# Patient Record
Sex: FEMALE | Race: WHITE
[De-identification: names, ages, dates, MRNs, and addresses within clinical notes are randomized per-mention and may not be internally consistent; named-entity substitution may affect disease eponyms.]

---

## 2020-05-24 NOTE — EKG
[place] [address]
Phone:  [phone]                     ELECTROCARDIOGRAM REPORT      
_______________________________________________________________________________
 
Name:         MARNI VAZQUZE                Room:                     [facility]#:    [id number]     Account #:     [id number]  
Admission:    20    Attend Phys:                     
Discharge:    20    Date of Birth: [birth year]  
Date of Service: 20 1312  Report #:      [id number]
        [id number]
_______________________________________________________________________________
THIS REPORT FOR:  //name//                      
 
                         [facility] ED
                                       
Test Date:    2020               Test Time:    13:12:30
Pat Name:     MARNI VAZQUEZ             Department:   
Patient ID:   [id number]            Room:          
Gender:       F                        Technician:   [facility]
:          1990               Requested By: Deana Petersen
Order Number: [id number] MD:   Duc Aguilera
                                 Measurements
Intervals                              Axis          
Rate:         73                       P:            53
NM:           166                      QRS:          -6
QRSD:         100                      T:            3
QT:           371                                    
QTc:          409                                    
                           Interpretive Statements
Sinus rhythm
Low voltage, precordial leads
No previous ECG available for comparison
 
Electronically Signed On 2020 13:11:03 CDT by Duc Aguilera
[email or web address]
 
 
 
 
 
 
 
 
 
 
 
 
 
 
 
 
 
 
 
 
  <ELECTRONICALLY SIGNED>
                                           By: Duc Aguilera MD, [facility]      
  20     1311
D: 20 1312   _____________________________________
T: 20 131   Duc Aguilera MD, [facility]        /EPI

## 2020-11-25 NOTE — EXE
[place] [address]
Phone:  [phone]                     STRESS ECHOCARDIOGRAM         
_______________________________________________________________________________
 
Name:         MARNI VAZQUEZ                Room:                     [facility]
M.R.#:    [id number]     Account #:     [id number]  
Admission:    11/25/20    Attend Phys:   Branden Garcia DO   
Discharge:                Date of Birth: [birth year]  
Date of Service: 11/25/20 1409  Report #:      [id number]
        [id number]
_______________________________________________________________________________
THIS REPORT FOR:
 
cc:  Branden Garcia Adam J DO Holkins, John M. MD [facility]       
                                                                       ~
 
--------------- APPROVED REPORT --------------
 
 
Study performed:  11/25/2020 11:25:44
 
Exam:  Stress Echocardiogram
Indication: Syncope
Patient Location: Out-Patient
Stress Nurse: Henna Borrego RN
Supervising Physician: Duc Aguilera MD
 
Ht: 5 ft 4 in      
HR: 115 bpm       BP: 128/84 mmHg 
 
Medical History
Cardiac Risk Factors: FHX of CAD
 
Procedure
The patient underwent an Exercise Stress Test using the Alexander 
Protocol. Blood pressure, heart rate, and EKG were monitored.
An Echocardiogram was performed by technician in four stages in quad 
fashion.  At peak stress, four selected images were obtained and 
placed side by side with resting images for comparison.
 
Stress Test Details
Stress Test:  Exercise stress testing was performed using a Alexander 
protocol.      
HR           
Resting HR:            115 bpm   Max Heart Rate (APMHR): 188 bpm 
 
Max HR Achieved:  188 bpm   Target HR (85% APMHR): 159 bpm  
% of APMHR:         100         
Recovery HR:            119 bpm        
HR response to stress: Normal HR response to stress      
 
BP           
Resting BP:  128/84 mmHg        
Max BP:       165/82 mmHg        
Recovery BP:       127/80 mmHg        
 
 
[place] [address]
Phone:  [phone]                     STRESS ECHOCARDIOGRAM         
_______________________________________________________________________________
 
Name:         MARNI VAZQUEZ                Room:                     [facility]
M.R.#:    [id number]     Account #:     [id number]  
Admission:    11/25/20    Attend Phys:   Branden Garcia DO   
Discharge:                Date of Birth: [birth year]  
Date of Service: 11/25/20 1409  Report #:      [id number]
        [id number]
_______________________________________________________________________________
BP response to stress: Normal blood pressure response to 
stress.      
ECG           
Resting ECG:  sinus rhythm, minor IVCD right type      
Stress ECG:     no ischemic st-t changes      
Arrhythmia:    no arrhtyhmias noted       
Recovery ECG: minor nonspecific st-t changes 
noted      
 
Clinical
Reason for Termination: Completed protocol, Maximal effort
Exercise duration: 9 min 01 sec
Highest Stage Achieved: Stage 3: 3.4 mph at 14% grade. 
Exercise capacity: 10.16 METs
 
Pre-Stress Echo
The resting Echocardiogram showed normal left ventricular 
contractility with an estimated Ejection Fraction of about 60-65%. 
Normal wall motion in all segments on baseline images.
 
Post-Stress Echo
The stress Echocardiogram showed normal left ventricular 
contractility with an estimated Ejection Fraction of about >70%. 
Normal augmentation of wall motion in all segments on post stress 
images. 
 
Conclusion
Clinical Response:  Non-ischemic
Exercise Capacity:  Average
Stress ECG Response:  Non-ischemic
Stress Echo Images:  Non-ischemic
 
Other Information
Study Quality: Good
 
 
 
 
 
 
 
 
 
 
  <ELECTRONICALLY SIGNED>
                                           By: Heath Mathews MD, FACC     
  11/25/20 1409
D: 11/25/20 1409   _____________________________________
T: 11/25/20 1409   Heath Mathews MD, FACC       /INF

## 2020-12-19 ENCOUNTER — HOSPITAL ENCOUNTER (EMERGENCY)
Dept: HOSPITAL 96 - M.ERS | Age: 30
Discharge: HOME | End: 2020-12-19
Payer: COMMERCIAL

## 2020-12-19 VITALS — SYSTOLIC BLOOD PRESSURE: 138 MMHG | DIASTOLIC BLOOD PRESSURE: 80 MMHG

## 2020-12-19 VITALS — WEIGHT: 180.01 LBS | HEIGHT: 64 IN | BODY MASS INDEX: 30.73 KG/M2

## 2020-12-19 DIAGNOSIS — M43.6: ICD-10-CM

## 2020-12-19 DIAGNOSIS — F41.9: Primary | ICD-10-CM

## 2020-12-19 DIAGNOSIS — Z98.890: ICD-10-CM

## 2020-12-20 NOTE — EKG
[place] [address]
Phone:  [phone]                     ELECTROCARDIOGRAM REPORT      
_______________________________________________________________________________
 
Name:         MARNI VAZQUEZ          Room:                     [facility]#:    [id number]     Account #:     [id number]  
Admission:    20    Attend Phys:                     
Discharge:    20    Date of Birth: [birth year]  
Date of Service: 20  Report #:      [id number]
        [id number]
_______________________________________________________________________________
THIS REPORT FOR:  //name//                      
 
                         [facility] ED
                                       
Test Date:    2020               Test Time:    20:10:03
Pat Name:     MARNI VAZQUEZ             Department:   
Patient ID:   [id number]            Room:          
Gender:       F                        Technician:   APOLLO
:          1990               Requested By: Deysi Beard
Order Number: [id number] MD:   Duc Aguilera
                                 Measurements
Intervals                              Axis          
Rate:         99                       P:            54
DC:           167                      QRS:          -8
QRSD:         104                      T:            -3
QT:           357                                    
QTc:          459                                    
                           Interpretive Statements
Sinus rhythm
Compared to ECG 2020 13:12:30
No significant changes
Electronically Signed On 2020 10:18:13 CST by Duc Aguilera
[email or web address]
 
 
 
 
 
 
 
 
 
 
 
 
 
 
 
 
 
 
 
 
 
  <ELECTRONICALLY SIGNED>
                                           By: Duc Aguilera MD, [facility]      
  20     1018
D: 20   _____________________________________
T: 20   Duc Aguilera MD, [facility]        /EPI

## 2021-01-05 ENCOUNTER — HOSPITAL ENCOUNTER (OUTPATIENT)
Dept: HOSPITAL 96 - M.MRI | Age: 31
End: 2021-01-05
Attending: FAMILY MEDICINE
Payer: COMMERCIAL

## 2021-01-05 DIAGNOSIS — M54.2: Primary | ICD-10-CM

## 2021-08-27 ENCOUNTER — HOSPITAL ENCOUNTER (EMERGENCY)
Dept: HOSPITAL 96 - M.ERS | Age: 31
Discharge: HOME | End: 2021-08-27
Payer: COMMERCIAL

## 2021-08-27 VITALS — WEIGHT: 139.99 LBS | BODY MASS INDEX: 23.9 KG/M2 | HEIGHT: 64 IN

## 2021-08-27 VITALS — DIASTOLIC BLOOD PRESSURE: 65 MMHG | SYSTOLIC BLOOD PRESSURE: 120 MMHG

## 2021-08-27 DIAGNOSIS — Z98.890: ICD-10-CM

## 2021-08-27 DIAGNOSIS — R59.1: Primary | ICD-10-CM

## 2021-08-27 DIAGNOSIS — K21.9: ICD-10-CM

## 2021-08-27 LAB
ABSOLUTE BASOPHILS: 0.1 THOU/UL (ref 0–0.2)
ABSOLUTE EOSINOPHILS: 0.2 THOU/UL (ref 0–0.7)
ABSOLUTE MONOCYTES: 0.6 THOU/UL (ref 0–1.2)
ALBUMIN SERPL-MCNC: 4 G/DL (ref 3.4–5)
ALP SERPL-CCNC: 59 U/L (ref 46–116)
ALT SERPL-CCNC: 23 U/L (ref 30–65)
ANION GAP SERPL CALC-SCNC: 8 MMOL/L (ref 7–16)
AST SERPL-CCNC: 15 U/L (ref 15–37)
BASOPHILS NFR BLD AUTO: 0.8 %
BILIRUB SERPL-MCNC: 0.6 MG/DL
BUN SERPL-MCNC: 14 MG/DL (ref 7–18)
CALCIUM SERPL-MCNC: 9.1 MG/DL (ref 8.5–10.1)
CHLORIDE SERPL-SCNC: 103 MMOL/L (ref 98–107)
CO2 SERPL-SCNC: 29 MMOL/L (ref 21–32)
CREAT SERPL-MCNC: 0.7 MG/DL (ref 0.6–1.3)
EOSINOPHIL NFR BLD: 2 %
GLUCOSE SERPL-MCNC: 86 MG/DL (ref 70–99)
GRANULOCYTES NFR BLD MANUAL: 50 %
HCT VFR BLD CALC: 39.5 % (ref 37–47)
HGB BLD-MCNC: 13 GM/DL (ref 12–15)
LYMPHOCYTES # BLD: 3 THOU/UL (ref 0.8–5.3)
LYMPHOCYTES NFR BLD AUTO: 39.6 %
MCH RBC QN AUTO: 27.1 PG (ref 26–34)
MCHC RBC AUTO-ENTMCNC: 32.9 G/DL (ref 28–37)
MCV RBC: 82.4 FL (ref 80–100)
MONOCYTES NFR BLD: 7.6 %
MPV: 7 FL. (ref 7.2–11.1)
NEUTROPHILS # BLD: 3.8 THOU/UL (ref 1.6–8.1)
NT-PRO BRAIN NAT PEPTIDE: 68 PG/ML (ref ?–300)
NUCLEATED RBCS: 0 /100WBC
PLATELET COUNT*: 306 THOU/UL (ref 150–400)
POTASSIUM SERPL-SCNC: 3.9 MMOL/L (ref 3.5–5.1)
PROT SERPL-MCNC: 8.1 G/DL (ref 6.4–8.2)
RBC # BLD AUTO: 4.8 MIL/UL (ref 4.2–5)
RDW-CV: 13.6 % (ref 10.5–14.5)
SODIUM SERPL-SCNC: 140 MMOL/L (ref 136–145)
WBC # BLD AUTO: 7.6 THOU/UL (ref 4–11)

## 2021-08-28 NOTE — EKG
[place] [address]
Phone:  [phone]                     ELECTROCARDIOGRAM REPORT      
_______________________________________________________________________________
 
Name:         MARNI VAZQUEZ          Room:                     [facility]#:    [id number]     Account #:     [id number]  
Admission:    21    Attend Phys:                     
Discharge:    21    Date of Birth: [birth year]  
Date of Service: 21  Report #:      [id number]
        [id number]
_______________________________________________________________________________
THIS REPORT FOR:  //name//                      
 
                         [facility] ED
                                       
Test Date:    2021               Test Time:    18:12:02
Pat Name:     MARNI VAZQUEZ             Department:   
Patient ID:   [id number]            Room:          
Gender:       F                        Technician:   
:          1990               Requested By: Luisa Evangelista
Order Number: [id number] MD:   Heath Mathews
                                 Measurements
Intervals                              Axis          
Rate:         80                       P:            69
ME:           182                      QRS:          1
QRSD:         104                      T:            16
QT:           376                                    
QTc:          434                                    
                           Interpretive Statements
Sinus rhythm
Compared to ECG 2020 20:10:03
No significant changes
Electronically Signed On 2021 11:07:55 CDT by Heath Mathews
[email or web address]
 
 
 
 
 
 
 
 
 
 
 
 
 
 
 
 
 
 
 
 
 
  <ELECTRONICALLY SIGNED>
                                           By: Heath Mathews MD, [facility]     
  21     1107
D: 21 1812   _____________________________________
T: 21 181   Heath Mathews MD, [facility]       /EPI

## 2021-10-18 ENCOUNTER — HOSPITAL ENCOUNTER (EMERGENCY)
Dept: HOSPITAL 96 - M.ERS | Age: 31
Discharge: HOME | End: 2021-10-18
Payer: COMMERCIAL

## 2021-10-18 VITALS — HEIGHT: 64 IN | BODY MASS INDEX: 24.75 KG/M2 | WEIGHT: 145 LBS

## 2021-10-18 VITALS — DIASTOLIC BLOOD PRESSURE: 70 MMHG | SYSTOLIC BLOOD PRESSURE: 124 MMHG

## 2021-10-18 DIAGNOSIS — R19.7: ICD-10-CM

## 2021-10-18 DIAGNOSIS — K21.9: ICD-10-CM

## 2021-10-18 DIAGNOSIS — R55: ICD-10-CM

## 2021-10-18 DIAGNOSIS — U07.1: Primary | ICD-10-CM

## 2021-10-18 DIAGNOSIS — Z98.890: ICD-10-CM

## 2021-10-18 LAB
ABSOLUTE BASOPHILS: 0.1 THOU/UL (ref 0–0.2)
ABSOLUTE EOSINOPHILS: 0 THOU/UL (ref 0–0.7)
ABSOLUTE MONOCYTES: 0.8 THOU/UL (ref 0–1.2)
ALBUMIN SERPL-MCNC: 3.7 G/DL (ref 3.4–5)
ALP SERPL-CCNC: 60 U/L (ref 46–116)
ALT SERPL-CCNC: 20 U/L (ref 30–65)
ANION GAP SERPL CALC-SCNC: 8 MMOL/L (ref 7–16)
AST SERPL-CCNC: 18 U/L (ref 15–37)
BASOPHILS NFR BLD AUTO: 1 %
BILIRUB SERPL-MCNC: 0.4 MG/DL
BUN SERPL-MCNC: 7 MG/DL (ref 7–18)
CALCIUM SERPL-MCNC: 8.7 MG/DL (ref 8.5–10.1)
CHLORIDE SERPL-SCNC: 103 MMOL/L (ref 98–107)
CO2 SERPL-SCNC: 27 MMOL/L (ref 21–32)
CREAT SERPL-MCNC: 0.7 MG/DL (ref 0.6–1.3)
EOSINOPHIL NFR BLD: 0.6 %
GLUCOSE SERPL-MCNC: 91 MG/DL (ref 70–99)
GRANULOCYTES NFR BLD MANUAL: 57.9 %
HCT VFR BLD CALC: 37 % (ref 37–47)
HGB BLD-MCNC: 12.7 GM/DL (ref 12–15)
LYMPHOCYTES # BLD: 1.7 THOU/UL (ref 0.8–5.3)
LYMPHOCYTES NFR BLD AUTO: 28.1 %
MCH RBC QN AUTO: 28.2 PG (ref 26–34)
MCHC RBC AUTO-ENTMCNC: 34.2 G/DL (ref 28–37)
MCV RBC: 82.5 FL (ref 80–100)
MONOCYTES NFR BLD: 12.4 %
MPV: 6.6 FL. (ref 7.2–11.1)
NEUTROPHILS # BLD: 3.6 THOU/UL (ref 1.6–8.1)
NUCLEATED RBCS: 0 /100WBC
PLATELET COUNT*: 244 THOU/UL (ref 150–400)
POTASSIUM SERPL-SCNC: 3.7 MMOL/L (ref 3.5–5.1)
PROT SERPL-MCNC: 8.1 G/DL (ref 6.4–8.2)
RBC # BLD AUTO: 4.49 MIL/UL (ref 4.2–5)
RDW-CV: 13.3 % (ref 10.5–14.5)
SODIUM SERPL-SCNC: 138 MMOL/L (ref 136–145)
WBC # BLD AUTO: 6.2 THOU/UL (ref 4–11)

## 2021-10-18 NOTE — EKG
[place] [address]
Phone:  [phone]                     ELECTROCARDIOGRAM REPORT      
_______________________________________________________________________________
 
Name:         MARNI VAZQUEZ          Room:                     [facility] 
M.R.#:    [id number]     Account #:     [id number]  
Admission:    10/18/21    Attend Phys:                     
Discharge:                Date of Birth: [birth year]  
Date of Service: 10/18/21 1524  Report #:      [id number]
        [id number]
_______________________________________________________________________________
THIS REPORT FOR:  //name//                      
 
                         [facility] ED
                                       
Test Date:    2021-10-18               Test Time:    15:24:14
Pat Name:     MARNI VAZQUEZ             Department:   
Patient ID:   [id number]            Room:          
Gender:                               Technician:   DANIEL
:          1990               Requested By: Luisa Evangelista
Order Number: [id number] MD:   Duc Aguilera
                                 Measurements
Intervals                              Axis          
Rate:         76                       P:            86
W[place]:           180                      QRS:          9
QRSD:         103                      T:            34
QT:           389                                    
QTc:          438                                    
                           Interpretive Statements
Sinus rhythm
Low voltage, precordial leads
Compared to ECG 2021 18:12:02
Low QRS voltage now present
Electronically Signed On 10- 16:20:58 CDT by Duc Aguilear
[email or web address]
 
 
 
 
 
 
 
 
 
 
 
 
 
 
 
 
 
 
 
 
  <ELECTRONICALLY SIGNED>
                                           By: Duc Aguilera MD, FAC      
  10/18/21     1620
D: 10/18/21 1524   _____________________________________
T: 10/18/21 1524   Duc Aguilera MD, [facility]        /EPI

## 2021-10-22 ENCOUNTER — HOSPITAL ENCOUNTER (OUTPATIENT)
Dept: HOSPITAL 96 - M.LAB | Age: 31
End: 2021-10-22
Payer: COMMERCIAL

## 2021-10-22 DIAGNOSIS — U07.1: ICD-10-CM

## 2021-10-22 DIAGNOSIS — R00.2: Primary | ICD-10-CM

## 2021-10-22 LAB — NT-PRO BRAIN NAT PEPTIDE: 49 PG/ML (ref ?–300)
